# Patient Record
Sex: FEMALE | Race: WHITE | HISPANIC OR LATINO
[De-identification: names, ages, dates, MRNs, and addresses within clinical notes are randomized per-mention and may not be internally consistent; named-entity substitution may affect disease eponyms.]

---
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
## 2023-08-04 ENCOUNTER — APPOINTMENT (OUTPATIENT)
Dept: ORTHOPEDIC SURGERY | Facility: CLINIC | Age: 23
End: 2023-08-04
Payer: COMMERCIAL

## 2023-08-04 DIAGNOSIS — S83.512A SPRAIN OF ANTERIOR CRUCIATE LIGAMENT OF LEFT KNEE, INITIAL ENCOUNTER: ICD-10-CM

## 2023-08-04 PROCEDURE — 99203 OFFICE O/P NEW LOW 30 MIN: CPT

## 2023-08-08 PROBLEM — S83.512A RUPTURE OF ANTERIOR CRUCIATE LIGAMENT OF LEFT KNEE, INITIAL ENCOUNTER: Status: ACTIVE | Noted: 2017-09-06

## 2023-08-08 NOTE — HISTORY OF PRESENT ILLNESS
[de-identified] : This is a 20-year-old patient here for follow-up evaluation left knee pain.  She previously underwent left knee ACL reconstruction.  She is doing well.  She denies any instability  No acute distress Left knee: Incision well-healed, full range of motion, good quad strength, no instability with a negative Lachman, no joint  palpation with a negative Angie  New MRI of the left knee shows small tear medial meniscus  Plan I went over findings with the patient.  I reviewed the MRI.  Since she is asymptomatic we will continue to observe.  Is not affecting her quality of life.  She previously underwent a medial meniscus repair as well.  If her symptoms become more symptomatic she will return to see me in the office